# Patient Record
Sex: MALE | URBAN - METROPOLITAN AREA
[De-identification: names, ages, dates, MRNs, and addresses within clinical notes are randomized per-mention and may not be internally consistent; named-entity substitution may affect disease eponyms.]

---

## 2022-01-09 ENCOUNTER — OUT OF OFFICE VISIT (OUTPATIENT)
Dept: URBAN - METROPOLITAN AREA MEDICAL CENTER 10 | Facility: MEDICAL CENTER | Age: 67
End: 2022-01-09
Payer: MEDICARE

## 2022-01-09 DIAGNOSIS — K31.89 ACQUIRED DEFORMITY OF DUODENUM: ICD-10-CM

## 2022-01-09 DIAGNOSIS — K92.1 ACUTE MELENA: ICD-10-CM

## 2022-01-09 DIAGNOSIS — K22.89 DILATION OF ESOPHAGUS: ICD-10-CM

## 2022-01-09 PROCEDURE — 43235 EGD DIAGNOSTIC BRUSH WASH: CPT | Performed by: INTERNAL MEDICINE

## 2022-01-19 ENCOUNTER — WEB ENCOUNTER (OUTPATIENT)
Dept: URBAN - METROPOLITAN AREA CLINIC 35 | Facility: CLINIC | Age: 67
End: 2022-01-19

## 2022-01-31 ENCOUNTER — WEB ENCOUNTER (OUTPATIENT)
Dept: URBAN - METROPOLITAN AREA CLINIC 12 | Facility: CLINIC | Age: 67
End: 2022-01-31

## 2022-01-31 ENCOUNTER — WEB ENCOUNTER (OUTPATIENT)
Dept: URBAN - METROPOLITAN AREA CLINIC 35 | Facility: CLINIC | Age: 67
End: 2022-01-31

## 2022-02-09 ENCOUNTER — OFFICE VISIT (OUTPATIENT)
Dept: URBAN - METROPOLITAN AREA CLINIC 31 | Facility: CLINIC | Age: 67
End: 2022-02-09
Payer: MEDICARE

## 2022-02-09 ENCOUNTER — LAB OUTSIDE AN ENCOUNTER (OUTPATIENT)
Dept: URBAN - METROPOLITAN AREA CLINIC 31 | Facility: CLINIC | Age: 67
End: 2022-02-09

## 2022-02-09 VITALS
SYSTOLIC BLOOD PRESSURE: 140 MMHG | WEIGHT: 273 LBS | OXYGEN SATURATION: 98 % | HEIGHT: 73 IN | HEART RATE: 85 BPM | DIASTOLIC BLOOD PRESSURE: 85 MMHG | BODY MASS INDEX: 36.18 KG/M2

## 2022-02-09 DIAGNOSIS — K21.00 GASTROESOPHAGEAL REFLUX DISEASE WITH ESOPHAGITIS WITHOUT HEMORRHAGE: ICD-10-CM

## 2022-02-09 DIAGNOSIS — R19.4 CHANGE IN BOWEL HABITS: ICD-10-CM

## 2022-02-09 DIAGNOSIS — D50.9 IRON DEFICIENCY ANEMIA, UNSPECIFIED IRON DEFICIENCY ANEMIA TYPE: ICD-10-CM

## 2022-02-09 PROBLEM — 266433003: Status: ACTIVE | Noted: 2022-02-09

## 2022-02-09 PROCEDURE — 99203 OFFICE O/P NEW LOW 30 MIN: CPT | Performed by: INTERNAL MEDICINE

## 2022-02-09 RX ORDER — ROSUVASTATIN CALCIUM 10 MG/1
1 TABLET TABLET, FILM COATED ORAL ONCE A DAY
Status: ACTIVE | COMMUNITY

## 2022-02-09 RX ORDER — ALLOPURINOL 300 MG/1
1 TABLET TABLET ORAL ONCE A DAY
Status: ACTIVE | COMMUNITY

## 2022-02-09 RX ORDER — METFORMIN HYDROCHLORIDE 750 MG/1
1 TABLET WITH EVENING MEAL TABLET, EXTENDED RELEASE ORAL ONCE A DAY
Status: ACTIVE | COMMUNITY

## 2022-02-09 RX ORDER — SODIUM PICOSULFATE, MAGNESIUM OXIDE, AND ANHYDROUS CITRIC ACID 10; 3.5; 12 MG/160ML; G/160ML; G/160ML
160 ML LIQUID ORAL
Qty: 320 MILLILITER | Refills: 0 | OUTPATIENT
Start: 2022-02-09 | End: 2022-02-11

## 2022-02-09 RX ORDER — LOSARTAN POTASSIUM AND HYDROCHLOROTHIAZIDE 50; 12.5 MG/1; MG/1
1 TABLET TABLET, FILM COATED ORAL ONCE A DAY
Status: ACTIVE | COMMUNITY

## 2022-02-09 RX ORDER — PANTOPRAZOLE SODIUM 40 MG/1
1 TABLET TABLET, DELAYED RELEASE ORAL ONCE A DAY
Status: ACTIVE | COMMUNITY

## 2022-02-09 RX ORDER — FERROUS SULFATE 142(45)MG
1 TABLET TABLET, EXTENDED RELEASE ORAL ONCE A DAY
Status: ACTIVE | COMMUNITY

## 2022-02-09 NOTE — HPI-CHANGE IN BOWEL HABITS
67 y/o male patient admits recent onset of diarrhea and change in bowel habits.  The onset was since he has been taking iron supplements.  Patient currently admits loose bowel movements per daily. Patient notes that since he was constipated, he was advised to take Senna and Miralax by his PCP, but did not see any improvements of symptoms.  Patient currently admits taking  Magnesium citrate with relief of symptoms. Patient denies blood, mucus, or melena in stools.    Patient admits associated abdominal cramps, bloating, and gas.   Patient admits  having a colonoscopy in (2017) with normal findings at [facility]. Patient denies family hx of colonic disease/cancer/polyps.

## 2022-02-09 NOTE — HPI-TODAY'S VISIT:
65 y/o male patient presents today for follow-up since his ER visit at [facility] on (01/05/2022-01/09/2022). The patient initially presented to [facility] ER with abdominal discomfort and dark, tarry stool after taking an aleve on an empty stomach. Risk factors consist of nonsteroidal anti-inflammatory drugs. Patient admits a history of GI bleeding 20 years ago related to a bleeding ulcer.  The workups while in the ER included CT of abdomen and EGD.  1. CT abdomen and pelvis on the 01/08/2022, mild thickening of the ascending and transverse colon, probably related to incomplete distention. However, early colitis could have a similar appearance from infectious or inflammatory process. Mild diverticulosis in the descending and sigmoid colon without diverticulitis. 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 2. Esophagogastroduodenoscopy on 01/05/2022 showed LA grade A reflux esophagitis. Nonbleeding gastric ulcers with no stigmata of bleeding. Normal examined duodenum. No specimens collected.  Patient denies any complications after his procedure. Since the procedure, the patient denies dysphagia, heartburn, globus, changes in appetite.  Patient contiues to take Pantoprazole 40 mg with relief of symptoms and currently denies taking any NSAIDs any longer.

## 2022-02-10 ENCOUNTER — TELEPHONE ENCOUNTER (OUTPATIENT)
Dept: URBAN - METROPOLITAN AREA CLINIC 12 | Facility: CLINIC | Age: 67
End: 2022-02-10

## 2022-02-10 ENCOUNTER — TELEPHONE ENCOUNTER (OUTPATIENT)
Dept: URBAN - METROPOLITAN AREA CLINIC 36 | Facility: CLINIC | Age: 67
End: 2022-02-10

## 2022-02-10 ENCOUNTER — TELEPHONE ENCOUNTER (OUTPATIENT)
Dept: URBAN - METROPOLITAN AREA CLINIC 35 | Facility: CLINIC | Age: 67
End: 2022-02-10

## 2022-02-10 RX ORDER — POLYETHYLENE GLYCOL 3350, SODIUM SULFATE ANHYDROUS, SODIUM BICARBONATE, SODIUM CHLORIDE, POTASSIUM CHLORIDE 236; 22.74; 6.74; 5.86; 2.97 G/4L; G/4L; G/4L; G/4L; G/4L
236 GM POWDER, FOR SOLUTION ORAL ONCE
Qty: 1 | Refills: 0 | OUTPATIENT

## 2022-02-10 RX ORDER — SODIUM, POTASSIUM,MAG SULFATES 17.5-3.13G
ML SOLUTION, RECONSTITUTED, ORAL ORAL AS DIRECTED
Qty: 1 | Refills: 0 | OUTPATIENT
Start: 2022-02-10 | End: 2022-02-12

## 2022-02-14 ENCOUNTER — TELEPHONE ENCOUNTER (OUTPATIENT)
Dept: URBAN - METROPOLITAN AREA CLINIC 36 | Facility: CLINIC | Age: 67
End: 2022-02-14

## 2022-02-15 ENCOUNTER — TELEPHONE ENCOUNTER (OUTPATIENT)
Dept: URBAN - METROPOLITAN AREA CLINIC 36 | Facility: CLINIC | Age: 67
End: 2022-02-15

## 2022-02-15 ENCOUNTER — OFFICE VISIT (OUTPATIENT)
Dept: URBAN - METROPOLITAN AREA SURGERY CENTER 8 | Facility: SURGERY CENTER | Age: 67
End: 2022-02-15
Payer: MEDICARE

## 2022-02-15 ENCOUNTER — CLAIMS CREATED FROM THE CLAIM WINDOW (OUTPATIENT)
Dept: URBAN - METROPOLITAN AREA CLINIC 4 | Facility: CLINIC | Age: 67
End: 2022-02-15
Payer: MEDICARE

## 2022-02-15 DIAGNOSIS — K31.89 FOCAL FOVEOLAR HYPERPLASIA: ICD-10-CM

## 2022-02-15 DIAGNOSIS — K31.89 ACQUIRED DEFORMITY OF DUODENUM: ICD-10-CM

## 2022-02-15 DIAGNOSIS — K25.9 ANTRAL ULCER: ICD-10-CM

## 2022-02-15 DIAGNOSIS — K21.9 ACID REFLUX: ICD-10-CM

## 2022-02-15 DIAGNOSIS — R19.4 ALTERATION IN BOWEL ELIMINATION: ICD-10-CM

## 2022-02-15 DIAGNOSIS — K21.9 GASTRO-ESOPHAGEAL REFLUX DISEASE WITHOUT ESOPHAGITIS: ICD-10-CM

## 2022-02-15 PROCEDURE — 88312 SPECIAL STAINS GROUP 1: CPT | Performed by: PATHOLOGY

## 2022-02-15 PROCEDURE — G8907 PT DOC NO EVENTS ON DISCHARG: HCPCS | Performed by: INTERNAL MEDICINE

## 2022-02-15 PROCEDURE — 43239 EGD BIOPSY SINGLE/MULTIPLE: CPT | Performed by: INTERNAL MEDICINE

## 2022-02-15 PROCEDURE — 45378 DIAGNOSTIC COLONOSCOPY: CPT | Performed by: INTERNAL MEDICINE

## 2022-02-15 PROCEDURE — 88305 TISSUE EXAM BY PATHOLOGIST: CPT | Performed by: PATHOLOGY

## 2022-02-15 RX ORDER — ALLOPURINOL 300 MG/1
1 TABLET TABLET ORAL ONCE A DAY
Status: ACTIVE | COMMUNITY

## 2022-02-15 RX ORDER — ROSUVASTATIN CALCIUM 10 MG/1
1 TABLET TABLET, FILM COATED ORAL ONCE A DAY
Status: ACTIVE | COMMUNITY

## 2022-02-15 RX ORDER — POLYETHYLENE GLYCOL 3350, SODIUM SULFATE ANHYDROUS, SODIUM BICARBONATE, SODIUM CHLORIDE, POTASSIUM CHLORIDE 236; 22.74; 6.74; 5.86; 2.97 G/4L; G/4L; G/4L; G/4L; G/4L
236 GM POWDER, FOR SOLUTION ORAL ONCE
Qty: 1 | Refills: 0 | Status: ACTIVE | COMMUNITY

## 2022-02-15 RX ORDER — PANTOPRAZOLE SODIUM 40 MG/1
1 TABLET TABLET, DELAYED RELEASE ORAL ONCE A DAY
Status: ACTIVE | COMMUNITY

## 2022-02-15 RX ORDER — LOSARTAN POTASSIUM AND HYDROCHLOROTHIAZIDE 50; 12.5 MG/1; MG/1
1 TABLET TABLET, FILM COATED ORAL ONCE A DAY
Status: ACTIVE | COMMUNITY

## 2022-02-15 RX ORDER — FERROUS SULFATE 142(45)MG
1 TABLET TABLET, EXTENDED RELEASE ORAL ONCE A DAY
Status: ACTIVE | COMMUNITY

## 2022-02-15 RX ORDER — PANTOPRAZOLE SODIUM 40 MG/1
1 TABLET TABLET, DELAYED RELEASE ORAL ONCE A DAY
Qty: 30 | Refills: 6 | OUTPATIENT
Start: 2022-02-15

## 2022-02-15 RX ORDER — METFORMIN HYDROCHLORIDE 750 MG/1
1 TABLET WITH EVENING MEAL TABLET, EXTENDED RELEASE ORAL ONCE A DAY
Status: ACTIVE | COMMUNITY

## 2022-03-02 ENCOUNTER — OFFICE VISIT (OUTPATIENT)
Dept: URBAN - METROPOLITAN AREA CLINIC 31 | Facility: CLINIC | Age: 67
End: 2022-03-02
Payer: MEDICARE

## 2022-03-02 ENCOUNTER — OFFICE VISIT (OUTPATIENT)
Dept: URBAN - METROPOLITAN AREA CLINIC 31 | Facility: CLINIC | Age: 67
End: 2022-03-02

## 2022-03-02 ENCOUNTER — DASHBOARD ENCOUNTERS (OUTPATIENT)
Age: 67
End: 2022-03-02

## 2022-03-02 VITALS
SYSTOLIC BLOOD PRESSURE: 130 MMHG | DIASTOLIC BLOOD PRESSURE: 78 MMHG | WEIGHT: 278 LBS | HEIGHT: 73 IN | BODY MASS INDEX: 36.84 KG/M2 | HEART RATE: 106 BPM | OXYGEN SATURATION: 99 %

## 2022-03-02 DIAGNOSIS — D50.9 IRON DEFICIENCY ANEMIA, UNSPECIFIED IRON DEFICIENCY ANEMIA TYPE: ICD-10-CM

## 2022-03-02 DIAGNOSIS — K29.70 GASTRITIS WITHOUT BLEEDING, UNSPECIFIED CHRONICITY, UNSPECIFIED GASTRITIS TYPE: ICD-10-CM

## 2022-03-02 DIAGNOSIS — K20.90 ESOPHAGITIS: ICD-10-CM

## 2022-03-02 PROBLEM — 4556007: Status: ACTIVE | Noted: 2022-03-02

## 2022-03-02 PROCEDURE — 99213 OFFICE O/P EST LOW 20 MIN: CPT | Performed by: INTERNAL MEDICINE

## 2022-03-02 RX ORDER — ALLOPURINOL 300 MG/1
1 TABLET TABLET ORAL ONCE A DAY
Status: ACTIVE | COMMUNITY

## 2022-03-02 RX ORDER — POLYETHYLENE GLYCOL 3350, SODIUM SULFATE ANHYDROUS, SODIUM BICARBONATE, SODIUM CHLORIDE, POTASSIUM CHLORIDE 236; 22.74; 6.74; 5.86; 2.97 G/4L; G/4L; G/4L; G/4L; G/4L
236 GM POWDER, FOR SOLUTION ORAL ONCE
Qty: 1 | Refills: 0 | Status: ON HOLD | COMMUNITY

## 2022-03-02 RX ORDER — METFORMIN HYDROCHLORIDE 750 MG/1
1 TABLET WITH EVENING MEAL TABLET, EXTENDED RELEASE ORAL ONCE A DAY
Status: ACTIVE | COMMUNITY

## 2022-03-02 RX ORDER — FERROUS SULFATE 142(45)MG
1 TABLET TABLET, EXTENDED RELEASE ORAL ONCE A DAY
Status: ACTIVE | COMMUNITY

## 2022-03-02 RX ORDER — PANTOPRAZOLE SODIUM 40 MG/1
1 TABLET TABLET, DELAYED RELEASE ORAL ONCE A DAY
Status: ON HOLD | COMMUNITY

## 2022-03-02 RX ORDER — PANTOPRAZOLE SODIUM 40 MG/1
1 TABLET TABLET, DELAYED RELEASE ORAL ONCE A DAY
Qty: 30 | Refills: 6 | Status: ACTIVE | COMMUNITY
Start: 2022-02-15

## 2022-03-02 RX ORDER — ROSUVASTATIN CALCIUM 10 MG/1
1 TABLET TABLET, FILM COATED ORAL ONCE A DAY
Status: ACTIVE | COMMUNITY

## 2022-03-02 RX ORDER — LOSARTAN POTASSIUM AND HYDROCHLOROTHIAZIDE 50; 12.5 MG/1; MG/1
1 TABLET TABLET, FILM COATED ORAL ONCE A DAY
Status: ACTIVE | COMMUNITY

## 2022-03-02 NOTE — HPI-TODAY'S VISIT:
65 y/o male patient presents today for follow-up to his colonoscopy and EGD, which was completed on (02/15/2022)  by Dr. Abhi Townsend. Patient denies any complications after his procedure. Since the procedure, the patient denies dysphagia, heartburn, bloating, gas, globus, changes in appetite, abdominal/epigastric pain or changes in bowel habits.   Patient notes that he has been taking Pantoprazole 40 mg with relief of symptoms.   Patient currently admits 1 formed bowel movements per day.  Patient continues to take  Magnesium citrate with relief of symptoms. Patient denies blood, mucus, or melena in stools.    Colonoscopy report shows: - Preparation of the colon was fair. - The entire examined colon is normal. - No specimens collected.  EGD report shows: - LA Grade A reflux esophagitis with no bleeding. Biopsied. - Gastritis. Biopsied. - Non-bleeding gastric ulcers with no stigmata of bleeding. - Normal examined duodenum. Biopsied.             Last visit (02/09/2022):  65 y/o male patient presents today for follow-up since his ER visit at [facility] on (01/05/2022-01/09/2022). The patient initially presented to [facility] ER with abdominal discomfort and dark, tarry stool after taking an aleve on an empty stomach. Risk factors consist of nonsteroidal anti-inflammatory drugs. Patient admits a history of GI bleeding 20 years ago related to a bleeding ulcer.  The workups while in the ER included CT of abdomen and EGD.  1. CT abdomen and pelvis on the 01/08/2022, mild thickening of the ascending and transverse colon, probably related to incomplete distention. However, early colitis could have a similar appearance from infectious or inflammatory process. Mild diverticulosis in the descending and sigmoid colon without diverticulitis. 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 2. Esophagogastroduodenoscopy on 01/05/2022 showed LA grade A reflux esophagitis. Nonbleeding gastric ulcers with no stigmata of bleeding. Normal examined duodenum. No specimens collected.  Patient denies any complications after his procedure. Since the procedure, the patient denies dysphagia, heartburn, globus, changes in appetite.  Patient contiues to take Pantoprazole 40 mg with relief of symptoms and currently denies taking any NSAIDs any longer.

## 2022-03-02 NOTE — HPI-CHANGE IN BOWEL HABITS
67 y/o male patient continues to experience intermittent constipation.  Patient currently admits 1 formed bowel movements per day.  Patient continues to take  Magnesium citrate with relief of symptoms. Patient denies blood, mucus, or melena in stools. Patient currently denies abdominal cramps, bloating, and gas.  Patient notes that he has been taking Pantoprazole 40 mg with relief of symptoms.        Last visit (02/09/2022):  67 y/o male patient admits recent onset of diarrhea and change in bowel habits.  The onset was since he has been taking iron supplements.  Patient currently admits loose bowel movements per daily. Patient notes that since he was constipated, he was advised to take Senna and Miralax by his PCP, but did not see any improvements of symptoms.  Patient currently admits taking  Magnesium citrate with relief of symptoms. Patient denies blood, mucus, or melena in stools.    Patient admits associated abdominal cramps, bloating, and gas.   Patient admits  having a colonoscopy in (2017) with normal findings at [facility]. Patient denies family hx of colonic disease/cancer/polyps.

## 2022-04-12 ENCOUNTER — WEB ENCOUNTER (OUTPATIENT)
Dept: URBAN - METROPOLITAN AREA CLINIC 35 | Facility: CLINIC | Age: 67
End: 2022-04-12

## 2022-05-04 ENCOUNTER — TELEPHONE ENCOUNTER (OUTPATIENT)
Dept: URBAN - METROPOLITAN AREA CLINIC 36 | Facility: CLINIC | Age: 67
End: 2022-05-04

## 2022-05-14 ENCOUNTER — WEB ENCOUNTER (OUTPATIENT)
Dept: URBAN - METROPOLITAN AREA CLINIC 31 | Facility: CLINIC | Age: 67
End: 2022-05-14

## 2022-05-14 RX ORDER — PANTOPRAZOLE SODIUM 40 MG/1
1 TABLET TABLET, DELAYED RELEASE ORAL ONCE A DAY
Qty: 30 TABLET | Refills: 3 | OUTPATIENT

## 2022-05-18 ENCOUNTER — WEB ENCOUNTER (OUTPATIENT)
Dept: URBAN - METROPOLITAN AREA CLINIC 31 | Facility: CLINIC | Age: 67
End: 2022-05-18

## 2022-05-18 ENCOUNTER — WEB ENCOUNTER (OUTPATIENT)
Dept: URBAN - METROPOLITAN AREA CLINIC 33 | Facility: CLINIC | Age: 67
End: 2022-05-18

## 2022-05-18 ENCOUNTER — WEB ENCOUNTER (OUTPATIENT)
Dept: URBAN - METROPOLITAN AREA CLINIC 35 | Facility: CLINIC | Age: 67
End: 2022-05-18

## 2022-05-25 ENCOUNTER — TELEPHONE ENCOUNTER (OUTPATIENT)
Dept: URBAN - METROPOLITAN AREA CLINIC 36 | Facility: CLINIC | Age: 67
End: 2022-05-25

## 2022-07-01 ENCOUNTER — TELEPHONE ENCOUNTER (OUTPATIENT)
Dept: URBAN - METROPOLITAN AREA CLINIC 12 | Facility: CLINIC | Age: 67
End: 2022-07-01

## 2022-07-01 PROBLEM — 87522002: Status: ACTIVE | Noted: 2022-02-09

## 2022-07-08 LAB
ABSOLUTE BASOPHILS: 30
ABSOLUTE EOSINOPHILS: 208
ABSOLUTE LYMPHOCYTES: 3168
ABSOLUTE MONOCYTES: 653
ABSOLUTE NEUTROPHILS: 5841
BASOPHILS: 0.3
EOSINOPHILS: 2.1
FERRITIN, SERUM: 8
HEMATOCRIT: 39.7
HEMOGLOBIN: 11.8
IRON BIND.CAP.(TIBC): 332
IRON SATURATION: 9
IRON: 31
LYMPHOCYTES: 32
MCH: 21.6
MCHC: 29.7
MCV: 72.7
MONOCYTES: 6.6
MPV: 10.6
NEUTROPHILS: 59
PLATELET COUNT: 363
RDW: 20.3
RED BLOOD CELL COUNT: 5.46
WHITE BLOOD CELL COUNT: 9.9

## 2022-09-07 ENCOUNTER — TELEPHONE ENCOUNTER (OUTPATIENT)
Dept: URBAN - METROPOLITAN AREA CLINIC 36 | Facility: CLINIC | Age: 67
End: 2022-09-07

## 2022-09-07 ENCOUNTER — WEB ENCOUNTER (OUTPATIENT)
Dept: URBAN - METROPOLITAN AREA CLINIC 31 | Facility: CLINIC | Age: 67
End: 2022-09-07

## 2022-09-07 ENCOUNTER — OFFICE VISIT (OUTPATIENT)
Dept: URBAN - METROPOLITAN AREA CLINIC 31 | Facility: CLINIC | Age: 67
End: 2022-09-07

## 2023-06-14 ENCOUNTER — ERX REFILL RESPONSE (OUTPATIENT)
Dept: URBAN - METROPOLITAN AREA CLINIC 35 | Facility: CLINIC | Age: 68
End: 2023-06-14

## 2023-06-14 RX ORDER — PANTOPRAZOLE SODIUM 40 MG/1
1 TABLET TABLET, DELAYED RELEASE ORAL ONCE A DAY
Qty: 30 | Refills: 6 | OUTPATIENT

## 2023-06-14 RX ORDER — PANTOPRAZOLE 40 MG/1
TAKE 1 TABLET DAILY TABLET, DELAYED RELEASE ORAL
Qty: 90 TABLET | Refills: 3 | OUTPATIENT

## 2023-09-07 ENCOUNTER — OFFICE VISIT (OUTPATIENT)
Dept: URBAN - METROPOLITAN AREA CLINIC 12 | Facility: CLINIC | Age: 68
End: 2023-09-07

## 2024-09-19 ENCOUNTER — OFFICE VISIT (OUTPATIENT)
Dept: URBAN - METROPOLITAN AREA CLINIC 12 | Facility: CLINIC | Age: 69
End: 2024-09-19
Payer: COMMERCIAL

## 2024-09-19 VITALS
BODY MASS INDEX: 38.17 KG/M2 | TEMPERATURE: 97.3 F | WEIGHT: 288 LBS | SYSTOLIC BLOOD PRESSURE: 132 MMHG | HEIGHT: 73 IN | DIASTOLIC BLOOD PRESSURE: 80 MMHG | HEART RATE: 75 BPM

## 2024-09-19 DIAGNOSIS — K92.1 MELENA: ICD-10-CM

## 2024-09-19 DIAGNOSIS — K21.9 CHRONIC GERD: ICD-10-CM

## 2024-09-19 PROBLEM — 235595009: Status: ACTIVE | Noted: 2024-09-19

## 2024-09-19 PROCEDURE — 99214 OFFICE O/P EST MOD 30 MIN: CPT | Performed by: STUDENT IN AN ORGANIZED HEALTH CARE EDUCATION/TRAINING PROGRAM

## 2024-09-19 RX ORDER — ALLOPURINOL 300 MG/1
1 TABLET TABLET ORAL ONCE A DAY
Status: ON HOLD | COMMUNITY

## 2024-09-19 RX ORDER — METFORMIN HYDROCHLORIDE 750 MG/1
1 TABLET WITH EVENING MEAL TABLET, EXTENDED RELEASE ORAL ONCE A DAY
Status: ON HOLD | COMMUNITY

## 2024-09-19 RX ORDER — POLYETHYLENE GLYCOL 3350, SODIUM SULFATE ANHYDROUS, SODIUM BICARBONATE, SODIUM CHLORIDE, POTASSIUM CHLORIDE 236; 22.74; 6.74; 5.86; 2.97 G/4L; G/4L; G/4L; G/4L; G/4L
236 GM POWDER, FOR SOLUTION ORAL ONCE
Qty: 1 | Refills: 0 | Status: ON HOLD | COMMUNITY

## 2024-09-19 RX ORDER — ROSUVASTATIN 10 MG/1
1 TABLET TABLET, FILM COATED ORAL ONCE A DAY
Status: ON HOLD | COMMUNITY

## 2024-09-19 RX ORDER — PANTOPRAZOLE 40 MG/1
TAKE 1 TABLET DAILY TABLET, DELAYED RELEASE ORAL
Qty: 90 TABLET | Refills: 3 | Status: ACTIVE | COMMUNITY

## 2024-09-19 RX ORDER — LOSARTAN POTASSIUM AND HYDROCHLOROTHIAZIDE 50; 12.5 MG/1; MG/1
1 TABLET TABLET, FILM COATED ORAL ONCE A DAY
Status: ACTIVE | COMMUNITY

## 2024-09-19 RX ORDER — FERROUS SULFATE 142(45)MG
1 TABLET TABLET, EXTENDED RELEASE ORAL ONCE A DAY
Status: ON HOLD | COMMUNITY

## 2024-09-19 NOTE — HPI-TODAY'S VISIT:
- Presenting complaint: Episode of black stools last week, which has since resolved. ("I had black stool last week. It cleared up now.") - few episodes of dark stool - Stools were purplish-black in color, ate a blueberry smoothie beforehand - No blood noted in the stool this time - Patient was drinking blueberry smoothies and taking meloxicam without food during this period - Patient was practicing intermittent fasting at the time - Associated symptoms: - Mild pain in lower right side of abdomen, possibly due to muscle strain from gym activity ("On my lower right side, the bottom down here had some pain, but I thought it'd be because I pulled the muscle in the gym") - Pain initially severe but has lightened up, now described as a "light strain" - No current belly pain reported - Bowel habits: - Bowel movements described as intermittent - Patient reports no issues with current bowel movement frequency - Past medical history: - History of stomach ulcer ("That's how I got the bleeding stomach when I was taking ibuprofen with no food on the stomach") - Two herniated discs at L4 and L5 - Current medications: - Meloxicam as needed for back pain, recently taken daily due to disc issues

## 2024-09-19 NOTE — HPI-MIGRATED HPI
67 y/o male patient presents today for follow-up to his colonoscopy and EGD, which was completed on (02/15/2022) by Dr. Abhi Townsend. Patient denies any complications after his procedure. Since the procedure, the patient denies dysphagia, heartburn, bloating, gas, globus, changes in appetite, abdominal/epigastric pain or changes in bowel habits. Patient notes that he has been taking Pantoprazole 40 mg with relief of symptoms. Patient currently admits 1 formed bowel movements per day. Patient continues to take Magnesium citrate with relief of symptoms. Patient denies blood, mucus, or melena in stools. Colonoscopy report shows: - Preparation of the colon was fair. - The entire examined colon is normal. - No specimens collected. EGD report shows: - LA Grade A reflux esophagitis with no bleeding. Biopsied. - Gastritis. Biopsied. - Non-bleeding gastric ulcers with no stigmata of bleeding. - Normal examined duodenum. Biopsied.

## 2024-09-20 LAB
A/G RATIO: 1.5
ABSOLUTE BASOPHILS: 31
ABSOLUTE EOSINOPHILS: 211
ABSOLUTE LYMPHOCYTES: 2457
ABSOLUTE MONOCYTES: 499
ABSOLUTE NEUTROPHILS: 4602
ALBUMIN: 4.1
ALKALINE PHOSPHATASE: 75
ALT (SGPT): 24
AST (SGOT): 21
BASOPHILS: 0.4
BILIRUBIN, TOTAL: 0.5
BUN/CREATININE RATIO: (no result)
BUN: 10
CALCIUM: 9.1
CARBON DIOXIDE, TOTAL: 26
CHLORIDE: 107
CREATININE: 1.02
EGFR: 80
EOSINOPHILS: 2.7
FERRITIN, SERUM: 31
GLOBULIN, TOTAL: 2.8
GLUCOSE: 94
HEMATOCRIT: 41.5
HEMOGLOBIN: 13.8
IRON BIND.CAP.(TIBC): 304
IRON SATURATION: 18
IRON: 54
LYMPHOCYTES: 31.5
MCH: 31.5
MCHC: 33.3
MCV: 94.7
MONOCYTES: 6.4
MPV: 10.6
NEUTROPHILS: 59
PLATELET COUNT: 287
POTASSIUM: 4.3
PROTEIN, TOTAL: 6.9
RDW: 14.3
RED BLOOD CELL COUNT: 4.38
SODIUM: 142
WHITE BLOOD CELL COUNT: 7.8